# Patient Record
Sex: FEMALE | Race: BLACK OR AFRICAN AMERICAN | NOT HISPANIC OR LATINO | Employment: FULL TIME | URBAN - METROPOLITAN AREA
[De-identification: names, ages, dates, MRNs, and addresses within clinical notes are randomized per-mention and may not be internally consistent; named-entity substitution may affect disease eponyms.]

---

## 2017-04-25 ENCOUNTER — HOSPITAL ENCOUNTER (EMERGENCY)
Facility: HOSPITAL | Age: 32
Discharge: HOME | End: 2017-04-25
Attending: EMERGENCY MEDICINE
Payer: MEDICAID

## 2017-04-25 VITALS
DIASTOLIC BLOOD PRESSURE: 91 MMHG | HEART RATE: 89 BPM | SYSTOLIC BLOOD PRESSURE: 163 MMHG | HEIGHT: 66 IN | RESPIRATION RATE: 18 BRPM | WEIGHT: 132 LBS | OXYGEN SATURATION: 99 % | BODY MASS INDEX: 21.21 KG/M2 | TEMPERATURE: 98 F

## 2017-04-25 DIAGNOSIS — M79.675 PAIN OF TOE OF LEFT FOOT: Primary | ICD-10-CM

## 2017-04-25 DIAGNOSIS — R52 PAIN: ICD-10-CM

## 2017-04-25 PROCEDURE — 99283 EMERGENCY DEPT VISIT LOW MDM: CPT

## 2017-04-25 PROCEDURE — 25000003 PHARM REV CODE 250: Performed by: EMERGENCY MEDICINE

## 2017-04-25 RX ORDER — TRAMADOL HYDROCHLORIDE 50 MG/1
50 TABLET ORAL
Status: COMPLETED | OUTPATIENT
Start: 2017-04-25 | End: 2017-04-25

## 2017-04-25 RX ORDER — TRAMADOL HYDROCHLORIDE 50 MG/1
50 TABLET ORAL EVERY 6 HOURS PRN
Qty: 12 TABLET | Refills: 0 | Status: SHIPPED | OUTPATIENT
Start: 2017-04-25 | End: 2017-05-05

## 2017-04-25 RX ORDER — TRAMADOL HYDROCHLORIDE 50 MG/1
50 TABLET ORAL EVERY 6 HOURS PRN
Qty: 12 TABLET | Refills: 0 | Status: SHIPPED | OUTPATIENT
Start: 2017-04-25 | End: 2017-04-25

## 2017-04-25 RX ADMIN — TRAMADOL HYDROCHLORIDE 50 MG: 50 TABLET, COATED ORAL at 11:04

## 2017-04-25 NOTE — ED PROVIDER NOTES
SCRIBE #1 NOTE: I, Mainor Martinez, am scribing for, and in the presence of, José Cardenas Jr., MD. I have scribed the entire note.      History      Chief Complaint   Patient presents with    Toe Pain     pt c/o pain to left great toe x's 1 month. denies injury       Review of patient's allergies indicates:  No Known Allergies     HPI   HPI    4/25/2017, 11:15 AM   History obtained from the patient      History of Present Illness: Chandan Juan is a 31 y.o. female patient who presents to the Emergency Department for left great toe pain which onset gradually one month ago. Sxs are constant and moderate in severity. Pt reports sxs are worsening. Pt states toenail hurts. There are no mitigating or exacerbating factors noted. Pt denies any fever, N/V/D, numbness, trauma, injury, fall, trouble walking, and all other sxs at this time. Pt is requesting x-ray of toe. No further complaints or concerns at this time.     Arrival mode: Personal vehicle      PCP: Primary Doctor No       Past Medical History:  Past Medical History:   Diagnosis Date    Hypertension     Kidney stones        Past Surgical History:  Past Surgical History:   Procedure Laterality Date    LITHOTRIPSY           Family History:  unknown    Social History:  Social History     Social History Main Topics    Smoking status: Current Every Day Smoker     Packs/day: 0.50    Smokeless tobacco: unknown    Alcohol use Yes      Comment: occasionally     Drug use: No    Sexual activity: unknown       ROS   Review of Systems   Constitutional: Negative for fever.   HENT: Negative for sore throat.    Respiratory: Negative for shortness of breath.    Cardiovascular: Negative for chest pain.   Gastrointestinal: Negative for abdominal pain, constipation, diarrhea, nausea and vomiting.   Genitourinary: Negative for dysuria.   Musculoskeletal: Negative for back pain.        (+) L great toe pain   Skin: Negative for rash.   Neurological: Negative for  "weakness.   Hematological: Does not bruise/bleed easily.       Physical Exam    Initial Vitals   BP Pulse Resp Temp SpO2   04/25/17 1105 04/25/17 1105 04/25/17 1105 04/25/17 1105 04/25/17 1105   169/111 94 18 98 °F (36.7 °C) 99 %      Physical Exam  Nursing Notes and Vital Signs Reviewed.  Constitutional: Patient is in no acute distress. Awake and alert. Well-developed and well-nourished.  Head: Atraumatic. Normocephalic.  Eyes: PERRL. EOM intact. Conjunctivae are not pale. No scleral icterus.  ENT: Mucous membranes are moist. Oropharynx is clear and symmetric.    Neck: Supple. Full ROM. No lymphadenopathy.  Cardiovascular: Regular rate. Regular rhythm. No murmurs, rubs, or gallops. Distal pulses are 2+ and symmetric.  Pulmonary/Chest: No respiratory distress. Clear to auscultation bilaterally. No wheezing, rales, or rhonchi.  Abdominal: Soft and non-distended.  There is no tenderness.  No rebound, guarding, or rigidity. Good bowel sounds.  Genitourinary: No CVA tenderness  Musculoskeletal: Moves all extremities. No obvious deformities. No edema. No calf tenderness.  Skin: Warm and dry.  Neurological:  Alert, awake, and appropriate.  Normal speech.  No acute focal neurological deficits are appreciated.  Psychiatric: Normal affect. Good eye contact. Appropriate in content.    ED Course    Procedures  ED Vital Signs:  Vitals:    04/25/17 1105 04/25/17 1155   BP: (!) 169/111 (!) 163/91   Pulse: 94 89   Resp: 18 18   Temp: 98 °F (36.7 °C) 97.9 °F (36.6 °C)   TempSrc: Oral Oral   SpO2: 99% 99%   Weight: 59.9 kg (132 lb)    Height: 5' 6" (1.676 m)        Imaging Results:  Imaging Results         X-Ray Toe 2 or more views (Final result) Result time:  04/25/17 11:36:56    Final result by Stephen Medina MD (04/25/17 11:36:56)    Impression:     Negative      Electronically signed by: STEPHEN MEDINA MD  Date:     04/25/17  Time:    11:36     Narrative:    History: Left great toe pain    No bony or joint abnormality is " seen.                      The Emergency Provider reviewed the vital signs and test results, which are outlined above.    ED Discussion     12:46 PM: Discussed with pt all pertinent ED information and results. Discussed plan of treatment with pt. Gave pt all f/u and return to the ED instructions. All questions and concerns were addressed at this time. Pt understands and agrees to plan as discussed. Pt is stable for discharge.     I discussed with patient and/or family/caretaker that negative X-ray does not rule out occult fracture or other soft tissue injury.  Persistent pain greater than 7-10 days or increased pain requires follow up, specifically with orthopedics.     ED Medication(s):  Medications   tramadol tablet 50 mg (50 mg Oral Given 4/25/17 1150)       Discharge Medication List as of 4/25/2017 11:47 AM      START taking these medications    Details   tramadol (ULTRAM) 50 mg tablet Take 1 tablet (50 mg total) by mouth every 6 (six) hours as needed., Starting 4/25/2017, Until Fri 5/5/17, Print             Follow-up Information     Follow up with Natasha De Luna DPM. Call in 2 days.    Specialty:  Podiatry    Contact information:    [address]  [facility] [address] [phone]              Medical Decision Making    Medical Decision Making:   Clinical Tests:   Radiological Study: Ordered and Reviewed           Scribe Attestation:   Scribe #1: I performed the above scribed service and the documentation accurately describes the services I performed. I attest to the accuracy of the note.    Attending:   Physician Attestation Statement for Scribe #1: I, José Cardenas Jr., MD, personally performed the services described in this documentation, as scribed by Mainor Martinez, in my presence, and it is both accurate and complete.          Clinical Impression       ICD-10-CM ICD-9-CM   1. Pain of toe of left foot M79.675 729.5   2. Pain R52 780.96       Disposition:   Disposition: Discharged  Condition:  Stable         José Cardenas Jr., MD  04/25/17 1802

## 2017-04-25 NOTE — DISCHARGE INSTRUCTIONS
Understanding the Pain Response  Your pain is important. It can slow healing and keep you from being active. You may have acute or chronic pain. Both types of pain respond to treatment. Work with your health care professional. Together you can find relief.  Types of pain  Acute pain is caused by a health problem or injury. The pain usually goes away when its cause is treated. You may have pain:  · From an illness or injury that needs emergency care.  · After an operation, such as heart surgery.  · During and after the birth of your baby.  Chronic pain lasts 3 to 6 months or more. It can be caused by a health problem or injury, like arthritis or a shoulder strain. Chronic pain can also exist without a clear cause.  Your perception of pain  Pain is a complex phenomenon that involves many of the chemicals found naturally in the spinal cord and brain. All pain signals travel to the brain. The brain sends back signals to protect the body. The brain also makes its own painkillers (endorphins). These can help reduce the pain.    1. Pain starts in 1 or more parts of the body. In some cases, the site of the pain is far from its source.  2. Pain signals move through nerves and up the spinal cord.  3. The brain reads the signals as pain. Natural painkillers are released.  4. The feeling of pain can be reduced in this way.  Date Last Reviewed: 6/8/2015  © 6737-6815 [employer]. [address] [place] [address]. All rights reserved. This information is not intended as a substitute for professional medical care. Always follow your healthcare professional's instructions.          Acute Pain, Uncertain Cause  Pain can be caused by many conditions that range from very minor to very serious. In some cases, though, pain comes and goes with no apparent cause.  We were not able to find the exact cause for your pain. At this time there is no sign of any serious illness causing your pain. More tests may be needed  to determine the cause. In many cases, pain like this goes away by itself.  Home care  Take any medicines as prescribed. If another medicine was not prescribed for pain, you can take an over-the-counter pain medicine such as ibuprofen or acetaminophen. Use these as directed on the label.    Follow-up care  Follow up with your healthcare provider or our staff as directed.  When to seek medical advice  Call your healthcare provider for any of the following:  · Pain changes in pattern  · Pain doesn't lessen or gets worse  · New symptoms appear  · Fever of 100.4ºF (38ºC) or higher, or as directed by your healthcare provider  Date Last Reviewed: 7/26/2015  © 3233-3838 [employer]. [address] [place] [address], [place] [address]. All rights reserved. This information is not intended as a substitute for professional medical care. Always follow your healthcare professional's instructions.

## 2017-04-25 NOTE — ED AVS SNAPSHOT
[facility]  [address]  [place] [address]               Chandan Juan   2017 11:09 AM   ED    Description:  Female : 1985   Department:  [facility]            Your Care was Coordinated By:     Provider Role From To    José Cardenas Jr., MD Attending Provider 17 [id number] --      Reason for Visit     Toe Pain           Diagnoses this Visit        Comments    Pain of toe of left foot    -  Primary     Pain           ED Disposition     ED Disposition Condition Comment    Discharge             To Do List           Follow-up Information     Follow up with Natasha De Luna DPM. Call in 2 days.    Specialty:  Podiatry    Contact information:    [address]  [place] [address] [phone]         These Medications        Disp Refills Start End    tramadol (ULTRAM) 50 mg tablet 12 tablet 0 2017    Take 1 tablet (50 mg total) by mouth every 6 (six) hours as needed. - Oral      [facility]     [facility] Care Line -  Assistance  Unless otherwise directed by your provider, please contact [facility], our nurse care line that is available for  assistance.     Registered nurses in the [facility] provide: appointment scheduling, clinical advisement, health education, and other advisory services.  Call: [phone] (toll free)               Medications           Message regarding Medications     Verify the changes and/or additions to your medication regime listed below are the same as discussed with your clinician today.  If any of these changes or additions are incorrect, please notify your healthcare provider.        START taking these NEW medications        Refills    tramadol (ULTRAM) 50 mg tablet 0    Sig: Take 1 tablet (50 mg total) by mouth every 6 (six) hours as needed.    Class: Print    Route: Oral      These medications were administered today        Dose Freq     "tramadol tablet 50 mg 50 mg ED 1 Time    Sig: Take 1 tablet (50 mg total) by mouth ED 1 Time.    Class: Normal    Route: Oral           Verify that the below list of medications is an accurate representation of the medications you are currently taking.  If none reported, the list may be blank. If incorrect, please contact your healthcare provider. Carry this list with you in case of emergency.           Current Medications     diclofenac (VOLTAREN) 50 MG EC tablet Take 1 tablet (50 mg total) by mouth 3 (three) times daily as needed.    tramadol (ULTRAM) 50 mg tablet Take 1 tablet (50 mg total) by mouth every 6 (six) hours as needed.    tramadol tablet 50 mg Take 1 tablet (50 mg total) by mouth ED 1 Time.    valsartan (DIOVAN) 80 MG tablet Take 80 mg by mouth once daily.           Clinical Reference Information           Your Vitals Were     BP Pulse Temp Resp Height Weight    169/111 (BP Location: Right arm, Patient Position: Sitting) 94 98 °F (36.7 °C) (Oral) 18 5' 6" (1.676 m) 59.9 kg (132 lb)    SpO2 BMI             99% 21.31 kg/m2         Allergies as of 4/25/2017     No Known Allergies      Immunizations Administered on Date of Encounter - 4/25/2017     None      ED Micro, Lab, POCT     None      ED Imaging Orders     Start Ordered       Status Ordering Provider    04/25/17 1121 04/25/17 1121  X-Ray Toe 2 or more views  1 time imaging      Final result         Discharge Instructions         Understanding the Pain Response  Your pain is important. It can slow healing and keep you from being active. You may have acute or chronic pain. Both types of pain respond to treatment. Work with your health care professional. Together you can find relief.  Types of pain  Acute pain is caused by a health problem or injury. The pain usually goes away when its cause is treated. You may have pain:  · From an illness or injury that needs emergency care.  · After an operation, such as heart surgery.  · During and after the birth " of your baby.  Chronic pain lasts 3 to 6 months or more. It can be caused by a health problem or injury, like arthritis or a shoulder strain. Chronic pain can also exist without a clear cause.  Your perception of pain  Pain is a complex phenomenon that involves many of the chemicals found naturally in the spinal cord and brain. All pain signals travel to the brain. The brain sends back signals to protect the body. The brain also makes its own painkillers (endorphins). These can help reduce the pain.    1. Pain starts in 1 or more parts of the body. In some cases, the site of the pain is far from its source.  2. Pain signals move through nerves and up the spinal cord.  3. The brain reads the signals as pain. Natural painkillers are released.  4. The feeling of pain can be reduced in this way.  Date Last Reviewed: 6/8/2015  © 3759-5838 [employer]. [address] [place] [address]. All rights reserved. This information is not intended as a substitute for professional medical care. Always follow your healthcare professional's instructions.          Acute Pain, Uncertain Cause  Pain can be caused by many conditions that range from very minor to very serious. In some cases, though, pain comes and goes with no apparent cause.  We were not able to find the exact cause for your pain. At this time there is no sign of any serious illness causing your pain. More tests may be needed to determine the cause. In many cases, pain like this goes away by itself.  Home care  Take any medicines as prescribed. If another medicine was not prescribed for pain, you can take an over-the-counter pain medicine such as ibuprofen or acetaminophen. Use these as directed on the label.    Follow-up care  Follow up with your healthcare provider or our staff as directed.  When to seek medical advice  Call your healthcare provider for any of the following:  · Pain changes in pattern  · Pain doesn't lessen or gets worse  · New  symptoms appear  · Fever of 100.4ºF (38ºC) or higher, or as directed by your healthcare provider  Date Last Reviewed: 7/26/2015 © 2000-2016 [employer]. [address] [place] [address], [place] [address]. All rights reserved. This information is not intended as a substitute for professional medical care. Always follow your healthcare professional's instructions.          [facility] Sign-Up     Activating your [facility] account is as easy as 1-2-3!     1) Visit [email or web address], select Sign Up Now, enter this activation code and your date of birth, then select Next.  [email or web address]  Expires: 6/9/2017 11:47 AM      2) Create a username and password to use when you visit [facility] in the future and select a security question in case you lose your password and select Next.    3) Enter your e-mail address and click Sign Up!    Additional Information  If you have questions, please e-mail [email or web address] or call [phone] to talk to our [facility] staff. Remember, [facility] is NOT to be used for urgent needs. For medical emergencies, dial 911.         Smoking Cessation     If you would like to quit smoking:   You may be eligible for free services if you are a [place] resident and started smoking cigarettes before September 1, 1988.  Call the Smoking Cessation Trust (SCT) toll free at (052) 025-9359 or (395) 500-4933.   Call 5-548-QUIT-NOW if you do not meet the above criteria.   Contact us via email: [email or web address]   View our website for more information: [email or web address]         [facility] complies with applicable Federal civil rights laws and does not discriminate on the basis of race, color, national origin, age, disability, or sex.        Language Assistance Services     ATTENTION: Language assistance services are available, free of charge. Please call [phone].      ATENCIÓN: Si habla español, tiene a young disposición servicios gratuitos de asistencia  lingüística. [facility] [phone].     ESTHER Ý: N?u b?n nói Ti?ng Vi?t, có các d?ch v? h? tr? ngôn ng? mi?n phí dành cho b?n. G?i s? [phone].

## 2017-06-13 ENCOUNTER — HOSPITAL ENCOUNTER (EMERGENCY)
Facility: HOSPITAL | Age: 32
Discharge: HOME | End: 2017-06-13
Payer: MEDICAID

## 2017-06-13 VITALS
HEART RATE: 68 BPM | DIASTOLIC BLOOD PRESSURE: 90 MMHG | WEIGHT: 123 LBS | RESPIRATION RATE: 18 BRPM | HEIGHT: 66 IN | OXYGEN SATURATION: 100 % | TEMPERATURE: 98 F | SYSTOLIC BLOOD PRESSURE: 150 MMHG | BODY MASS INDEX: 19.77 KG/M2

## 2017-06-13 DIAGNOSIS — S39.011A STRAIN OF MUSCLE, FASCIA AND TENDON OF ABDOMEN, INITIAL ENCOUNTER: ICD-10-CM

## 2017-06-13 DIAGNOSIS — I10 ESSENTIAL HYPERTENSION: Primary | ICD-10-CM

## 2017-06-13 DIAGNOSIS — Z76.0 MEDICATION REFILL: ICD-10-CM

## 2017-06-13 DIAGNOSIS — R51.9 HEADACHE, UNSPECIFIED HEADACHE TYPE: ICD-10-CM

## 2017-06-13 LAB
B-HCG UR QL: NEGATIVE
BILIRUB UR QL STRIP: NEGATIVE
CLARITY UR: CLEAR
COLOR UR: YELLOW
GLUCOSE UR QL STRIP: NEGATIVE
HGB UR QL STRIP: NEGATIVE
KETONES UR QL STRIP: NEGATIVE
LEUKOCYTE ESTERASE UR QL STRIP: NEGATIVE
NITRITE UR QL STRIP: NEGATIVE
PH UR STRIP: 6 [PH] (ref 5–8)
PROT UR QL STRIP: NEGATIVE
SP GR UR STRIP: <=1.005 (ref 1–1.03)
URN SPEC COLLECT METH UR: ABNORMAL
UROBILINOGEN UR STRIP-ACNC: NEGATIVE EU/DL

## 2017-06-13 PROCEDURE — 63600175 PHARM REV CODE 636 W HCPCS: Performed by: PHYSICIAN ASSISTANT

## 2017-06-13 PROCEDURE — 81003 URINALYSIS AUTO W/O SCOPE: CPT

## 2017-06-13 PROCEDURE — 96372 THER/PROPH/DIAG INJ SC/IM: CPT

## 2017-06-13 PROCEDURE — 81025 URINE PREGNANCY TEST: CPT

## 2017-06-13 PROCEDURE — 99284 EMERGENCY DEPT VISIT MOD MDM: CPT | Mod: 25

## 2017-06-13 RX ORDER — IBUPROFEN 800 MG/1
800 TABLET ORAL 3 TIMES DAILY PRN
Qty: 15 TABLET | Refills: 0 | Status: SHIPPED | OUTPATIENT
Start: 2017-06-13 | End: 2017-06-13 | Stop reason: CLARIF

## 2017-06-13 RX ORDER — KETOROLAC TROMETHAMINE 30 MG/ML
60 INJECTION, SOLUTION INTRAMUSCULAR; INTRAVENOUS ONCE
Status: COMPLETED | OUTPATIENT
Start: 2017-06-13 | End: 2017-06-13

## 2017-06-13 RX ORDER — VALSARTAN 80 MG/1
80 TABLET ORAL DAILY
Qty: 30 TABLET | Refills: 0 | Status: SHIPPED | OUTPATIENT
Start: 2017-06-13 | End: 2017-06-13

## 2017-06-13 RX ORDER — VALSARTAN 80 MG/1
80 TABLET ORAL DAILY
Qty: 90 TABLET | Refills: 3 | Status: SHIPPED | OUTPATIENT
Start: 2017-06-13 | End: 2018-06-13

## 2017-06-13 RX ORDER — IBUPROFEN 800 MG/1
800 TABLET ORAL 3 TIMES DAILY PRN
Qty: 15 TABLET | Refills: 0 | Status: SHIPPED | OUTPATIENT
Start: 2017-06-13 | End: 2017-06-13

## 2017-06-13 RX ORDER — IBUPROFEN 800 MG/1
800 TABLET ORAL 3 TIMES DAILY PRN
Qty: 15 TABLET | Refills: 0 | Status: SHIPPED | OUTPATIENT
Start: 2017-06-13

## 2017-06-13 RX ORDER — VALSARTAN 80 MG/1
80 TABLET ORAL DAILY
Qty: 90 TABLET | Refills: 3 | Status: SHIPPED | OUTPATIENT
Start: 2017-06-13 | End: 2017-06-13

## 2017-06-13 RX ORDER — VALSARTAN 80 MG/1
80 TABLET ORAL DAILY
Qty: 30 TABLET | Refills: 0 | Status: SHIPPED | OUTPATIENT
Start: 2017-06-13 | End: 2017-06-13 | Stop reason: CLARIF

## 2017-06-13 RX ADMIN — KETOROLAC TROMETHAMINE 60 MG: 60 INJECTION, SOLUTION INTRAMUSCULAR at 02:06

## 2017-06-13 NOTE — ED PROVIDER NOTES
"  History      Chief Complaint   Patient presents with    Abdominal Pain     Pt states, "I am having bad pain in my right lower abdomen".       Review of patient's allergies indicates:  No Known Allergies     HPI   HPI    2017, 12:40 PM   History obtained from the patient      History of Present Illness: Chandan Juan is a 31 y.o. female patient who presents to the Emergency Department for right lower abdominal pain x 1-2 weeks.  Sneezing, coughing, and laughing worsen pain symptoms.  Associated symptoms include headache.  Denies fever, vomiting, diarrhea, nausea.   Patient states she has not taken blood pressure medication in 2-3 months.  Reports minimal relief with BC powder.  Rates headache pain /10.  Abdominal pain is intermittent.        Arrival mode: Personal vehicle    PCP: Primary Doctor No       Past Medical History:  Past Medical History:   Diagnosis Date    Hypertension     Kidney stones        Past Surgical History:  Past Surgical History:   Procedure Laterality Date     SECTION      LITHOTRIPSY           Family History:  Family History   Problem Relation Age of Onset    Hypertension Mother     Hypertension Father        Social History:  Social History     Social History Main Topics    Smoking status: Current Every Day Smoker     Packs/day: 0.50    Smokeless tobacco: Not on file    Alcohol use Yes      Comment: occasionally     Drug use: No    Sexual activity: Yes     Birth control/ protection: None       ROS   Review of Systems   Constitutional: Negative for chills and fever.   HENT: Negative for ear pain and rhinorrhea.    Respiratory: Negative for cough and wheezing.    Gastrointestinal: Positive for abdominal pain. Negative for diarrhea, nausea and vomiting.   Neurological: Positive for headaches.       Physical Exam      Initial Vitals [17 1217]   BP Pulse Resp Temp SpO2   (!) 174/109 97 20 99.6 °F (37.6 °C) 98 %      Physical Exam  Nursing Notes and Vital Signs " "Reviewed.  Constitutional: Patient is in no apparent distress. Awake and alert. Well-developed and well-nourished.  Head: Atraumatic. Normocephalic.  Eyes: PERRL. EOM intact. Conjunctivae are not pale. No scleral icterus.  ENT: Mucous membranes are moist. Oropharynx is clear and symmetric.    Neck: Supple. Full ROM. No lymphadenopathy.  Cardiovascular: Regular rate. Regular rhythm. No murmurs, rubs, or gallops. Distal pulses are 2+ and symmetric.  Pulmonary/Chest: No respiratory distress. Clear to auscultation bilaterally. No wheezing, rales, or rhonchi.  Abdominal: Soft and non-distended.  There is right lower quadrant tenderness; small mass palpated.  Pain of RLQ reproduced with lumbar extension.  No rebound, guarding, or rigidity. Good bowel sounds  Genitourinary: No CVA tenderness  Pelvic:  Normal external genitalia without lesions. Normal hair distribution. Adequate perineal body, normal urethral meatus. Vagina moist and well rugated without lesions or discharge. Cervix pink, without lesions, discharge or tenderness. No significant cystocele or rectocele. Bimanual exam shows uterus to be normal size, regular, mobile and nontender. Adnexa without masses or tenderness.   Musculoskeletal: Moves all extremities. No obvious deformities. No edema. No calf tenderness.  Skin: Warm and dry.  Neurological:  Alert, awake, and appropriate.  Normal speech.  No acute focal neurological deficits are appreciated.  Psychiatric: Normal affect. Good eye contact. Appropriate in content.    ED Course    Procedures  ED Vital Signs:  Vitals:    06/13/17 1217 06/13/17 1422   BP: (!) 174/109 (!) 150/90   Pulse: 97 68   Resp: 20 18   Temp: 99.6 °F (37.6 °C) 98 °F (36.7 °C)   TempSrc: Oral Oral   SpO2: 98% 100%   Weight: 55.8 kg (123 lb)    Height: 5' 6" (1.676 m)        Abnormal Lab Results:  Labs Reviewed   URINALYSIS - Abnormal; Notable for the following:        Result Value    Specific Gravity, UA <=1.005 (*)     All other components " within normal limits   PREGNANCY TEST, URINE RAPID        All Lab Results:  Results for orders placed or performed during the hospital encounter of 06/13/17   Urinalysis Clean Catch   Result Value Ref Range    Specimen UA Urine, Clean Catch     Color, UA Yellow Yellow, Straw, Jacque    Appearance, UA Clear Clear    pH, UA 6.0 5.0 - 8.0    Specific Gravity, UA <=1.005 (A) 1.005 - 1.030    Protein, UA Negative Negative    Glucose, UA Negative Negative    Ketones, UA Negative Negative    Bilirubin (UA) Negative Negative    Occult Blood UA Negative Negative    Nitrite, UA Negative Negative    Urobilinogen, UA Negative <2.0 EU/dL    Leukocytes, UA Negative Negative   Rapid Pregnancy, Urine   Result Value Ref Range    Preg Test, Ur Negative          Imaging Results:  Imaging Results          US Soft Tissue Misc (Final result)  Result time 06/13/17 14:35:25   Procedure changed from US Abdomen Limited     Final result by Beto Mitchell III, MD (06/13/17 14:35:25)                 Impression:      1. Unremarkable exam      Electronically signed by: BETO MITCHELL MD  Date:     06/13/17  Time:    14:35              Narrative:    US SOFT TISSUE MISC    Clinical Indication:  Palpable lump in the right lower quadrant       Findings: Targeted ultrasound of the right lower quadrant abdominal wall demonstrates no mass, cystic lesion, fluid collection, hernia or other abnormality.                                      The Emergency Provider reviewed the vital signs and test results, which are outlined above.    ED Discussion     2:20 PM:  Discussed with pt all pertinent ED information and results. Discussed pt dx and plan of tx. Gave pt all f/u and return to the ED instructions. All questions and concerns were addressed at this time. Pt expresses understanding of information and instructions, and is comfortable with plan to discharge. Pt is stable for discharge.    Pre-hypertension/Hypertension: The pt has been informed that they may  have pre-hypertension or hypertension based on a blood pressure reading in the ED. I recommend that the pt call the PCP listed on their discharge instructions or a physician of their choice this week to arrange f/u for further evaluation of possible pre-hypertension or hypertension.     I discussed with patient and/or family/caretaker that evaluation in the ED does not suggest any emergent or life threatening medical conditions requiring immediate intervention beyond what was provided in the ED, and I believe patient is safe for discharge.  Regardless, an unremarkable evaluation in the ED does not preclude the development or presence of a serious of life threatening condition. As such, patient was instructed to return immediately for any worsening or change in current symptoms.      ED Medication(s):  Medications   ketorolac injection 60 mg (60 mg Intramuscular Given 6/13/17 1400)       Discharge Medication List as of 6/13/2017  3:00 PM          Follow-up Information     [facility] in 2 days.    Specialty:  Urgent Care  Contact information:  [address] [facility] [address] [phone]             [facility] in 2 days.    Specialty:  Internal Medicine  Contact information:  [address] [facility] [address] [phone]  Additional information:  (off [facility]) 1st floor                   Medical Decision Making                  Clinical Impression       ICD-10-CM ICD-9-CM   1. Essential hypertension I10 401.9   2. Strain of muscle, fascia and tendon of abdomen, initial encounter S39.011A 848.8   3. Headache, unspecified headache type R51 784.0   4. Medication refill Z76.0 V68.1       Disposition:   Disposition: Discharged  Condition: Stable           Erika Mays PA-C  06/13/17 1538

## 2023-03-17 ENCOUNTER — HOSPITAL ENCOUNTER (EMERGENCY)
Facility: HOSPITAL | Age: 38
Discharge: HOME | End: 2023-03-17
Attending: EMERGENCY MEDICINE
Payer: MEDICAID

## 2023-03-17 VITALS
SYSTOLIC BLOOD PRESSURE: 162 MMHG | HEIGHT: 64 IN | RESPIRATION RATE: 16 BRPM | TEMPERATURE: 98 F | OXYGEN SATURATION: 100 % | BODY MASS INDEX: 22.53 KG/M2 | HEART RATE: 72 BPM | WEIGHT: 132 LBS | DIASTOLIC BLOOD PRESSURE: 88 MMHG

## 2023-03-17 DIAGNOSIS — I10 HYPERTENSION, UNSPECIFIED TYPE: ICD-10-CM

## 2023-03-17 DIAGNOSIS — R51.9 NONINTRACTABLE HEADACHE, UNSPECIFIED CHRONICITY PATTERN, UNSPECIFIED HEADACHE TYPE: ICD-10-CM

## 2023-03-17 DIAGNOSIS — I10 HYPERTENSION: ICD-10-CM

## 2023-03-17 DIAGNOSIS — F19.10 DRUG ABUSE: ICD-10-CM

## 2023-03-17 DIAGNOSIS — F41.9 ANXIETY: Primary | ICD-10-CM

## 2023-03-17 LAB
AMPHET+METHAMPHET UR QL: ABNORMAL
ANION GAP SERPL CALC-SCNC: 9 MMOL/L (ref 8–16)
B-HCG UR QL: NEGATIVE
BACTERIA #/AREA URNS HPF: ABNORMAL /HPF
BARBITURATES UR QL SCN>200 NG/ML: NEGATIVE
BASOPHILS # BLD AUTO: 0.04 K/UL (ref 0–0.2)
BASOPHILS NFR BLD: 1.3 % (ref 0–1.9)
BENZODIAZ UR QL SCN>200 NG/ML: NEGATIVE
BILIRUB UR QL STRIP: NEGATIVE
BUN SERPL-MCNC: 15 MG/DL (ref 6–20)
BZE UR QL SCN: NEGATIVE
CALCIUM SERPL-MCNC: 8.5 MG/DL (ref 8.7–10.5)
CANNABINOIDS UR QL SCN: ABNORMAL
CHLORIDE SERPL-SCNC: 103 MMOL/L (ref 95–110)
CLARITY UR: ABNORMAL
CO2 SERPL-SCNC: 25 MMOL/L (ref 23–29)
COLOR UR: YELLOW
CREAT SERPL-MCNC: 0.8 MG/DL (ref 0.5–1.4)
CREAT UR-MCNC: 107.7 MG/DL (ref 15–325)
DIFFERENTIAL METHOD: ABNORMAL
EOSINOPHIL # BLD AUTO: 0 K/UL (ref 0–0.5)
EOSINOPHIL NFR BLD: 0.6 % (ref 0–8)
ERYTHROCYTE [DISTWIDTH] IN BLOOD BY AUTOMATED COUNT: 15.8 % (ref 11.5–14.5)
EST. GFR  (NO RACE VARIABLE): >60 ML/MIN/1.73 M^2
GLUCOSE SERPL-MCNC: 121 MG/DL (ref 70–110)
GLUCOSE UR QL STRIP: NEGATIVE
HCT VFR BLD AUTO: 34.9 % (ref 37–48.5)
HGB BLD-MCNC: 10.4 G/DL (ref 12–16)
HGB UR QL STRIP: ABNORMAL
IMM GRANULOCYTES # BLD AUTO: 0.01 K/UL (ref 0–0.04)
IMM GRANULOCYTES NFR BLD AUTO: 0.3 % (ref 0–0.5)
KETONES UR QL STRIP: NEGATIVE
LEUKOCYTE ESTERASE UR QL STRIP: ABNORMAL
LYMPHOCYTES # BLD AUTO: 0.7 K/UL (ref 1–4.8)
LYMPHOCYTES NFR BLD: 20.4 % (ref 18–48)
MCH RBC QN AUTO: 24.8 PG (ref 27–31)
MCHC RBC AUTO-ENTMCNC: 29.8 G/DL (ref 32–36)
MCV RBC AUTO: 83 FL (ref 82–98)
METHADONE UR QL SCN>300 NG/ML: NEGATIVE
MICROSCOPIC COMMENT: ABNORMAL
MONOCYTES # BLD AUTO: 0.2 K/UL (ref 0.3–1)
MONOCYTES NFR BLD: 7.2 % (ref 4–15)
NEUTROPHILS # BLD AUTO: 2.2 K/UL (ref 1.8–7.7)
NEUTROPHILS NFR BLD: 70.2 % (ref 38–73)
NITRITE UR QL STRIP: POSITIVE
NRBC BLD-RTO: 0 /100 WBC
OPIATES UR QL SCN: NEGATIVE
PCP UR QL SCN>25 NG/ML: NEGATIVE
PH UR STRIP: 8 [PH] (ref 5–8)
PLATELET # BLD AUTO: 201 K/UL (ref 150–450)
PMV BLD AUTO: 10.6 FL (ref 9.2–12.9)
POTASSIUM SERPL-SCNC: 3.7 MMOL/L (ref 3.5–5.1)
PROT UR QL STRIP: ABNORMAL
RBC # BLD AUTO: 4.19 M/UL (ref 4–5.4)
RBC #/AREA URNS HPF: 2 /HPF (ref 0–4)
SODIUM SERPL-SCNC: 137 MMOL/L (ref 136–145)
SP GR UR STRIP: 1.02 (ref 1–1.03)
SQUAMOUS #/AREA URNS HPF: 5 /HPF
TOXICOLOGY INFORMATION: ABNORMAL
URN SPEC COLLECT METH UR: ABNORMAL
UROBILINOGEN UR STRIP-ACNC: NEGATIVE EU/DL
WBC # BLD AUTO: 3.18 K/UL (ref 3.9–12.7)
WBC #/AREA URNS HPF: 18 /HPF (ref 0–5)
WBC CLUMPS URNS QL MICRO: ABNORMAL

## 2023-03-17 PROCEDURE — 80048 BASIC METABOLIC PNL TOTAL CA: CPT | Performed by: EMERGENCY MEDICINE

## 2023-03-17 PROCEDURE — 25000003 PHARM REV CODE 250: Performed by: EMERGENCY MEDICINE

## 2023-03-17 PROCEDURE — 81025 URINE PREGNANCY TEST: CPT | Performed by: NURSE PRACTITIONER

## 2023-03-17 PROCEDURE — 93010 EKG 12-LEAD: ICD-10-PCS | Mod: ,,, | Performed by: INTERNAL MEDICINE

## 2023-03-17 PROCEDURE — 96374 THER/PROPH/DIAG INJ IV PUSH: CPT

## 2023-03-17 PROCEDURE — 80307 DRUG TEST PRSMV CHEM ANLYZR: CPT | Performed by: NURSE PRACTITIONER

## 2023-03-17 PROCEDURE — 85025 COMPLETE CBC W/AUTO DIFF WBC: CPT | Performed by: EMERGENCY MEDICINE

## 2023-03-17 PROCEDURE — 87088 URINE BACTERIA CULTURE: CPT | Performed by: NURSE PRACTITIONER

## 2023-03-17 PROCEDURE — 87186 SC STD MICRODIL/AGAR DIL: CPT | Performed by: NURSE PRACTITIONER

## 2023-03-17 PROCEDURE — 96375 TX/PRO/DX INJ NEW DRUG ADDON: CPT

## 2023-03-17 PROCEDURE — 93010 ELECTROCARDIOGRAM REPORT: CPT | Mod: ,,, | Performed by: INTERNAL MEDICINE

## 2023-03-17 PROCEDURE — 81000 URINALYSIS NONAUTO W/SCOPE: CPT | Mod: 59 | Performed by: NURSE PRACTITIONER

## 2023-03-17 PROCEDURE — 99291 CRITICAL CARE FIRST HOUR: CPT | Mod: 25

## 2023-03-17 PROCEDURE — 87086 URINE CULTURE/COLONY COUNT: CPT | Performed by: NURSE PRACTITIONER

## 2023-03-17 PROCEDURE — 63600175 PHARM REV CODE 636 W HCPCS: Performed by: EMERGENCY MEDICINE

## 2023-03-17 PROCEDURE — 93005 ELECTROCARDIOGRAM TRACING: CPT

## 2023-03-17 PROCEDURE — 87077 CULTURE AEROBIC IDENTIFY: CPT | Performed by: NURSE PRACTITIONER

## 2023-03-17 RX ORDER — HYDRALAZINE HYDROCHLORIDE 10 MG/1
10 TABLET, FILM COATED ORAL
COMMUNITY
Start: 2022-08-09

## 2023-03-17 RX ORDER — CLONIDINE HYDROCHLORIDE 0.2 MG/1
0.2 TABLET ORAL
Status: COMPLETED | OUTPATIENT
Start: 2023-03-17 | End: 2023-03-17

## 2023-03-17 RX ORDER — LABETALOL HYDROCHLORIDE 5 MG/ML
10 INJECTION, SOLUTION INTRAVENOUS
Status: COMPLETED | OUTPATIENT
Start: 2023-03-17 | End: 2023-03-17

## 2023-03-17 RX ORDER — HYDRALAZINE HYDROCHLORIDE 20 MG/ML
10 INJECTION INTRAMUSCULAR; INTRAVENOUS
Status: COMPLETED | OUTPATIENT
Start: 2023-03-17 | End: 2023-03-17

## 2023-03-17 RX ADMIN — HYDRALAZINE HYDROCHLORIDE 10 MG: 20 INJECTION, SOLUTION INTRAMUSCULAR; INTRAVENOUS at 05:03

## 2023-03-17 RX ADMIN — LABETALOL HYDROCHLORIDE 10 MG: 5 INJECTION INTRAVENOUS at 04:03

## 2023-03-17 RX ADMIN — CLONIDINE HYDROCHLORIDE 0.2 MG: 0.2 TABLET ORAL at 06:03

## 2023-03-17 NOTE — FIRST PROVIDER EVALUATION
"Medical screening examination initiated.  I have conducted a focused provider triage encounter, findings are as follows:    Brief history of present illness:  Patient presents with headache, dizziness and elevated blood pressure.    Vitals:    03/17/23 0042   BP: (!) 196/116   BP Location: Right arm   Patient Position: Sitting   Pulse: 92   Resp: 14   Temp: 97.5 °F (36.4 °C)   TempSrc: Oral   SpO2: 100%   Weight: 59.9 kg (132 lb)   Height: 5' 4" (1.626 m)       Pertinent physical exam:      Brief workup plan:      Preliminary workup initiated; this workup will be continued and followed by the physician or advanced practice provider that is assigned to the patient when roomed.  "

## 2023-03-17 NOTE — ED PROVIDER NOTES
"SCRIBE #1 NOTE: I, Solomon Evans, am scribing for, and in the presence of, Sammy Rivera MD. I have scribed the HPI, ROS, and PEx.     SCRIBE #2 NOTE: I, Anjali Olmedo, am scribing for, and in the presence of,  José Cardenas Jr., MD. I have scribed the remaining portions of the note not scribed by Scribe #1.    History     Chief Complaint   Patient presents with    Anxiety     Per pt "my head hurts and I'm dizzy" per EMS report, pt has anxiety post smoking a joint. Noncompliance with BP medications. Pt nodding off in triage      HPI  3/17/2023, 4:09 AM  History obtained from the patient    HPI:  Chandan Juan is a 37 y.o. female with a PMH of HTN who presents to the the [facility] emergency department for evaluation of HA which onset  x 1 day. Pt reports that her HA is most likely due to her elevated BP. Pt also states that she is noncompliant with her BP medication. No associated symptoms reported. Exacerbating factors: not reported. Prior treatment not reported. No other complaints or concerns.     Arrival mode: Personal vehicle         PCP: Primary Doctor No    Review of patient's allergies indicates:  No Known Allergies   Past Medical History:   Diagnosis Date    Hypertension     Kidney stones      Past Surgical History:   Procedure Laterality Date     SECTION      LITHOTRIPSY         Family History   Problem Relation Age of Onset    Hypertension Mother     Hypertension Father      Social History     Tobacco Use    Smoking status: Every Day     Packs/day: 0.50     Types: Cigarettes    Smokeless tobacco: Not on file   Substance and Sexual Activity    Alcohol use: Yes     Comment: occasionally     Drug use: No    Sexual activity: Yes     Birth control/protection: None      Review of Systems     Review of Systems   Constitutional: Negative.    HENT: Negative.     Eyes: Negative.    Respiratory: Negative.     Cardiovascular: Negative.    Gastrointestinal: Negative.    Endocrine: Negative.  "   Musculoskeletal: Negative.    Skin: Negative.    Allergic/Immunologic: Negative.    Neurological:  Positive for headaches.   Hematological: Negative.    Psychiatric/Behavioral: Negative.          Physical Exam     Initial Vitals [03/17/23 0042]   BP Pulse Resp Temp SpO2   (!) 196/116 92 14 97.5 °F (36.4 °C) 100 %      MAP       --          Physical Exam  Nursing notes and vital signs reviewed.  Constitutional: Patient is in mild distress. Pt is somnolent  Head: Normocephalic. Atraumatic.   Eyes:  Conjunctivae are not pale. No scleral icterus. Left eye exotropia  ENT: Mucous membranes moist.   Neck: Supple.   Cardiovascular: Regular rate. Regular rhythm.   Pulmonary: No respiratory distress.   Abdominal: Non-distended.   Musculoskeletal: Moves all extremities. No obvious deformities. No edema.   Skin: Warm and dry.   Neurological:  Alert, awake, and appropriate. Normal speech. No acute lateralizing neurologic deficits appreciated.   Psychiatric: Normal affect.       ED Course   Critical Care    Date/Time: 3/17/2023 5:32 AM  Performed by: Sammy Rivera MD  Authorized by: Sammy Rivera MD   Direct patient critical care time: 15 minutes  Additional history critical care time: 6 minutes  Ordering / reviewing critical care time: 7 minutes  Documentation critical care time: 7 minutes  Total critical care time (exclusive of procedural time) : 35 minutes  Critical care time was exclusive of separately billable procedures and treating other patients and teaching time.  Critical care was necessary to treat or prevent imminent or life-threatening deterioration of the following conditions: HTN emergency with EKG changes.  Critical care was time spent personally by me on the following activities: blood draw for specimens, development of treatment plan with patient or surrogate, interpretation of cardiac output measurements, evaluation of patient's response to treatment, examination of patient, obtaining history from  "patient or surrogate, ordering and performing treatments and interventions, ordering and review of laboratory studies, ordering and review of radiographic studies, pulse oximetry, re-evaluation of patient's condition and review of old charts.      Vitals:    03/17/23 0042 03/17/23 0445 03/17/23 0514 03/17/23 0530   BP: (!) 196/116 (!) 186/114 (!) 184/128 (!) 171/110   Pulse: 92 78 79 64   Resp: 14 16 20 18   Temp: 97.5 °F (36.4 °C) 98.1 °F (36.7 °C)     TempSrc: Oral Oral     SpO2: 100% 99% 99% 100%   Weight: 59.9 kg (132 lb)      Height: 5' 4" (1.626 m)       03/17/23 0532 03/17/23 0600 03/17/23 0630 03/17/23 0818   BP: (!) 171/110 (!) 161/104 (!) 177/96 (!) 162/88   Pulse:  86 77 72   Resp:  19 15 16   Temp:   98.1 °F (36.7 °C) 98.1 °F (36.7 °C)   TempSrc:   Oral Oral   SpO2:  100% 100% 100%   Weight:       Height:         Lab Results Interpreted as Abnormal:  Labs Reviewed   DRUG SCREEN PANEL, URINE EMERGENCY - Abnormal; Notable for the following components:       Result Value    Amphetamine Screen, Ur Presumptive Positive (*)     THC Presumptive Positive (*)     All other components within normal limits    Narrative:     Specimen Source->Urine   URINALYSIS, REFLEX TO URINE CULTURE - Abnormal; Notable for the following components:    Appearance, UA Hazy (*)     Protein, UA Trace (*)     Occult Blood UA 2+ (*)     Nitrite, UA Positive (*)     Leukocytes, UA 1+ (*)     All other components within normal limits    Narrative:     Specimen Source->Urine   CBC W/ AUTO DIFFERENTIAL - Abnormal; Notable for the following components:    WBC 3.18 (*)     Hemoglobin 10.4 (*)     Hematocrit 34.9 (*)     MCH 24.8 (*)     MCHC 29.8 (*)     RDW 15.8 (*)     Lymph # 0.7 (*)     Mono # 0.2 (*)     All other components within normal limits   BASIC METABOLIC PANEL - Abnormal; Notable for the following components:    Glucose 121 (*)     Calcium 8.5 (*)     All other components within normal limits   URINALYSIS MICROSCOPIC - Abnormal; " Notable for the following components:    WBC, UA 18 (*)     WBC Clumps, UA Many (*)     Bacteria Moderate (*)     All other components within normal limits    Narrative:     Specimen Source->Urine   CULTURE, URINE   PREGNANCY TEST, URINE RAPID    Narrative:     Specimen Source->Urine      All Lab Results:  Results for orders placed or performed during the hospital encounter of 03/17/23   Drug screen panel, emergency   Result Value Ref Range    Benzodiazepines Negative Negative    Methadone metabolites Negative Negative    Cocaine (Metab.) Negative Negative    Opiate Scrn, Ur Negative Negative    Barbiturate Screen, Ur Negative Negative    Amphetamine Screen, Ur Presumptive Positive (A) Negative    THC Presumptive Positive (A) Negative    Phencyclidine Negative Negative    Creatinine, Urine 107.7 15.0 - 325.0 mg/dL    Toxicology Information SEE COMMENT    Urinalysis, Reflex to Urine Culture Urine, Clean Catch    Specimen: Urine   Result Value Ref Range    Specimen UA Urine, Clean Catch     Color, UA Yellow Yellow, Straw, Jacque    Appearance, UA Hazy (A) Clear    pH, UA 8.0 5.0 - 8.0    Specific Gravity, UA 1.020 1.005 - 1.030    Protein, UA Trace (A) Negative    Glucose, UA Negative Negative    Ketones, UA Negative Negative    Bilirubin (UA) Negative Negative    Occult Blood UA 2+ (A) Negative    Nitrite, UA Positive (A) Negative    Urobilinogen, UA Negative <2.0 EU/dL    Leukocytes, UA 1+ (A) Negative   Pregnancy, urine rapid   Result Value Ref Range    Preg Test, Ur Negative    CBC auto differential   Result Value Ref Range    WBC 3.18 (L) 3.90 - 12.70 K/uL    RBC 4.19 4.00 - 5.40 M/uL    Hemoglobin 10.4 (L) 12.0 - 16.0 g/dL    Hematocrit 34.9 (L) 37.0 - 48.5 %    MCV 83 82 - 98 fL    MCH 24.8 (L) 27.0 - 31.0 pg    MCHC 29.8 (L) 32.0 - 36.0 g/dL    RDW 15.8 (H) 11.5 - 14.5 %    Platelets 201 150 - 450 K/uL    MPV 10.6 9.2 - 12.9 fL    Immature Granulocytes 0.3 0.0 - 0.5 %    Gran # (ANC) 2.2 1.8 - 7.7 K/uL     Immature Grans (Abs) 0.01 0.00 - 0.04 K/uL    Lymph # 0.7 (L) 1.0 - 4.8 K/uL    Mono # 0.2 (L) 0.3 - 1.0 K/uL    Eos # 0.0 0.0 - 0.5 K/uL    Baso # 0.04 0.00 - 0.20 K/uL    nRBC 0 0 /100 WBC    Gran % 70.2 38.0 - 73.0 %    Lymph % 20.4 18.0 - 48.0 %    Mono % 7.2 4.0 - 15.0 %    Eosinophil % 0.6 0.0 - 8.0 %    Basophil % 1.3 0.0 - 1.9 %    Differential Method Automated    Basic metabolic panel   Result Value Ref Range    Sodium 137 136 - 145 mmol/L    Potassium 3.7 3.5 - 5.1 mmol/L    Chloride 103 95 - 110 mmol/L    CO2 25 23 - 29 mmol/L    Glucose 121 (H) 70 - 110 mg/dL    BUN 15 6 - 20 mg/dL    Creatinine 0.8 0.5 - 1.4 mg/dL    Calcium 8.5 (L) 8.7 - 10.5 mg/dL    Anion Gap 9 8 - 16 mmol/L    eGFR >60 >60 mL/min/1.73 m^2   Urinalysis Microscopic   Result Value Ref Range    RBC, UA 2 0 - 4 /hpf    WBC, UA 18 (H) 0 - 5 /hpf    WBC Clumps, UA Many (A) None-Rare    Bacteria Moderate (A) None-Occ /hpf    Squam Epithel, UA 5 /hpf    Microscopic Comment SEE COMMENT      Imaging Results              X-Ray Chest 1 View (Final result)  Result time 03/17/23 07:10:11      Final result by Fortunato Guerrier MD (03/17/23 07:10:11)                   Impression:      No acute process seen.      Electronically signed by: Fortunato Guerrier MD  Date:    03/17/2023  Time:    07:10               Narrative:    EXAMINATION:  XR CHEST 1 VIEW    CLINICAL HISTORY:  Essential (primary) hypertension    FINDINGS:  Single view of the chest.  No comparison    Cardiac silhouette is normal.  The lungs demonstrate no evidence of active disease.  No evidence of pleural effusion or pneumothorax.  Bones appear intact.                                       CT Head Without Contrast (Final result)  Result time 03/17/23 07:01:55      Final result by Fortunato Guerrier MD (03/17/23 07:01:55)                   Impression:      No acute abnormality.    All CT scans at this facility use dose modulation, iterative reconstruction, and/or weight based dosing when  appropriate to reduce radiation dose to as low as reasonable achievable.      Electronically signed by: Fortunato Guerrier MD  Date:    03/17/2023  Time:    07:01               Narrative:    EXAMINATION:  CT HEAD WITHOUT CONTRAST    CLINICAL HISTORY:  Headache, new or worsening, neuro deficit (Age 19-49y);    TECHNIQUE:  Low dose axial CT images obtained throughout the head without intravenous contrast. Sagittal and coronal reconstructions were performed.    All CT scans at this facility use dose modulation, iterative reconstruction, and/or weight based dosing when appropriate to reduce radiation dose to as low as reasonable achievable.    COMPARISON:  None.    FINDINGS:  Intracranial compartment:    The brain parenchyma appears normal. No parenchymal mass, hemorrhage, edema or major vascular distribution infarct.    Ventricles and sulci are normal in size for age without evidence of hydrocephalus.    No extra-axial blood or fluid collections.    Skull/extracranial contents (limited evaluation): No fracture. Mastoid air cells and paranasal sinuses are essentially clear.                                     ED Physician's independent review of the above imaging: agree with radiologist.    The EKG was ordered, reviewed, and independently interpreted by the ED Physician:  Interpretation time: 5:29  Rate: 64 bpm  Rhythm: normal sinus rhythm  Interpretation: T wave abnormality, consider anterior ischemia. No STEMI.      The emergency physician reviewed the vital signs and test results, which are outlined above.     ED Discussion     6:00 AM: Dr. Rivera transfers care of patient to Dr. Cardenas pending lab and imaging results.     8:01 AM: Reassessed pt at this time.   Discussed with pt all pertinent ED information and results. Discussed pt dx and plan of tx. Gave pt all f/u and return to the ED instructions. All questions and concerns were addressed at this time. Pt expresses understanding of information and instructions, and  is comfortable with plan to discharge. Pt is stable for discharge.    I discussed with patient and/or family/caretaker that evaluation in the ED does not suggest any emergent or life threatening medical conditions requiring immediate intervention beyond what was provided in the ED, and I believe patient is safe for discharge.  Regardless, an unremarkable evaluation in the ED does not preclude the development or presence of a serious of life threatening condition. As such, patient was instructed to return immediately for any worsening or change in current symptoms.      Patient's evaluation in the ED does not suggest any emergent or life-threatening medical conditions requiring immediate intervention beyond what was provided in the ED, and I believe patient is safe for discharge.  Regardless, an unremarkable evaluation in the ED does not preclude the development or presence of a serious or life-threatening condition. As such, patient was given return instructions for any change or worsening of symptoms.     Medical Decision Making:   Clinical Tests:   Lab Tests: Ordered and Reviewed  Radiological Study: Ordered and Reviewed  Medical Tests: Ordered and Reviewed       ED Medication(s):  Medications   labetaloL injection 10 mg (10 mg Intravenous Given 3/17/23 0450)   hydrALAZINE injection 10 mg (10 mg Intravenous Given 3/17/23 0532)   cloNIDine tablet 0.2 mg (0.2 mg Oral Given 3/17/23 0636)     Discharge Medication List as of 3/17/2023  7:58 AM        Prescription Management: I performed a review of the patient's current Rx medication list as input by nursing staff.     Follow-up Information       PCP. Call in 2 days.               [facility] - Emergency Dept..    Specialty: Emergency Medicine  Why: If symptoms worsen  Contact information:  [address] [facility] [address] [phone]                           Scribe Attestation:   Scribe #1: I performed the above scribed service and the  documentation accurately describes the services I performed. I attest to the accuracy of the note.     Attending:   Physician Attestation Statement for Scribe #1: I, Sammy Rivera MD, personally performed the services described in this documentation, as scribed by Solomon Evans, in my presence, and it is both accurate and complete. As with other dictation methods such as dictation software, small errors or inconsistencies may be overlooked due to the goal of spending more face-to-face time with patients.    Scribe Attestation:   Scribe #2: I performed the above scribed service and the documentation accurately describes the services I performed. I attest to the accuracy of the note.    Attending Attestation:           Physician Attestation for Scribe:    Physician Attestation Statement for Scribe #2: I, José Cardenas Jr., MD, reviewed documentation, as scribed by Anjali Olmedo in my presence, and it is both accurate and complete. I also acknowledge and confirm the content of the note done by Scribe #1.       Clinical Impression       ICD-10-CM ICD-9-CM   1. Anxiety  F41.9 300.00   2. Hypertension  I10 401.9   3. Hypertension, unspecified type  I10 401.9   4. Nonintractable headache, unspecified chronicity pattern, unspecified headache type  R51.9 784.0   5. Drug abuse  F19.10 305.90     Disposition:   Disposition: Discharged  Condition: Stable       José Cardenas Jr., MD  03/17/23 0781

## 2023-03-17 NOTE — ED NOTES
Per MD pt can hv Clonidine 0.2 mg if she has a ride home. Per pt she isn't driving home. Will administer med as prescribed.

## 2023-03-20 LAB — BACTERIA UR CULT: ABNORMAL

## 2023-03-23 ENCOUNTER — TELEPHONE (OUTPATIENT)
Dept: EMERGENCY MEDICINE | Facility: HOSPITAL | Age: 38
End: 2023-03-23
Payer: MEDICAID

## 2024-09-04 ENCOUNTER — HOSPITAL ENCOUNTER (EMERGENCY)
Facility: OTHER | Age: 39
Discharge: HOME | End: 2024-09-04
Attending: EMERGENCY MEDICINE
Payer: MEDICAID

## 2024-09-04 VITALS
SYSTOLIC BLOOD PRESSURE: 165 MMHG | HEIGHT: 64 IN | OXYGEN SATURATION: 100 % | TEMPERATURE: 98 F | WEIGHT: 138 LBS | BODY MASS INDEX: 23.56 KG/M2 | DIASTOLIC BLOOD PRESSURE: 108 MMHG | RESPIRATION RATE: 13 BRPM | HEART RATE: 83 BPM

## 2024-09-04 DIAGNOSIS — R51.9 NONINTRACTABLE HEADACHE, UNSPECIFIED CHRONICITY PATTERN, UNSPECIFIED HEADACHE TYPE: Primary | ICD-10-CM

## 2024-09-04 DIAGNOSIS — I10 HYPERTENSION: ICD-10-CM

## 2024-09-04 LAB
ANION GAP SERPL CALC-SCNC: 11 MMOL/L (ref 8–16)
BASOPHILS # BLD AUTO: 0.04 K/UL (ref 0–0.2)
BASOPHILS NFR BLD: 0.8 % (ref 0–1.9)
BUN SERPL-MCNC: 12 MG/DL (ref 6–20)
CALCIUM SERPL-MCNC: 9.2 MG/DL (ref 8.7–10.5)
CHLORIDE SERPL-SCNC: 105 MMOL/L (ref 95–110)
CO2 SERPL-SCNC: 22 MMOL/L (ref 23–29)
CREAT SERPL-MCNC: 0.8 MG/DL (ref 0.5–1.4)
DIFFERENTIAL METHOD BLD: ABNORMAL
EOSINOPHIL # BLD AUTO: 0.1 K/UL (ref 0–0.5)
EOSINOPHIL NFR BLD: 1.9 % (ref 0–8)
ERYTHROCYTE [DISTWIDTH] IN BLOOD BY AUTOMATED COUNT: 17.8 % (ref 11.5–14.5)
EST. GFR  (NO RACE VARIABLE): >60 ML/MIN/1.73 M^2
GLUCOSE SERPL-MCNC: 89 MG/DL (ref 70–110)
HCT VFR BLD AUTO: 35 % (ref 37–48.5)
HGB BLD-MCNC: 10.8 G/DL (ref 12–16)
HIV 1+2 AB+HIV1 P24 AG SERPL QL IA: NEGATIVE
IMM GRANULOCYTES # BLD AUTO: 0.02 K/UL (ref 0–0.04)
IMM GRANULOCYTES NFR BLD AUTO: 0.4 % (ref 0–0.5)
LYMPHOCYTES # BLD AUTO: 1.1 K/UL (ref 1–4.8)
LYMPHOCYTES NFR BLD: 23.5 % (ref 18–48)
MCH RBC QN AUTO: 25.5 PG (ref 27–31)
MCHC RBC AUTO-ENTMCNC: 30.9 G/DL (ref 32–36)
MCV RBC AUTO: 83 FL (ref 82–98)
MONOCYTES # BLD AUTO: 0.6 K/UL (ref 0.3–1)
MONOCYTES NFR BLD: 12.8 % (ref 4–15)
NEUTROPHILS # BLD AUTO: 3 K/UL (ref 1.8–7.7)
NEUTROPHILS NFR BLD: 60.6 % (ref 38–73)
NRBC BLD-RTO: 0 /100 WBC
PLATELET # BLD AUTO: 254 K/UL (ref 150–450)
PMV BLD AUTO: 9.3 FL (ref 9.2–12.9)
POTASSIUM SERPL-SCNC: 4 MMOL/L (ref 3.5–5.1)
RBC # BLD AUTO: 4.23 M/UL (ref 4–5.4)
SODIUM SERPL-SCNC: 138 MMOL/L (ref 136–145)
WBC # BLD AUTO: 4.86 K/UL (ref 3.9–12.7)

## 2024-09-04 PROCEDURE — 36415 COLL VENOUS BLD VENIPUNCTURE: CPT | Performed by: EMERGENCY MEDICINE

## 2024-09-04 PROCEDURE — 85025 COMPLETE CBC W/AUTO DIFF WBC: CPT | Performed by: EMERGENCY MEDICINE

## 2024-09-04 PROCEDURE — 87389 HIV-1 AG W/HIV-1&-2 AB AG IA: CPT | Performed by: EMERGENCY MEDICINE

## 2024-09-04 PROCEDURE — 80048 BASIC METABOLIC PNL TOTAL CA: CPT | Performed by: EMERGENCY MEDICINE

## 2024-09-04 PROCEDURE — 25000003 PHARM REV CODE 250: Performed by: EMERGENCY MEDICINE

## 2024-09-04 PROCEDURE — 93010 ELECTROCARDIOGRAM REPORT: CPT | Mod: ,,, | Performed by: INTERNAL MEDICINE

## 2024-09-04 PROCEDURE — 93005 ELECTROCARDIOGRAM TRACING: CPT

## 2024-09-04 PROCEDURE — 99284 EMERGENCY DEPT VISIT MOD MDM: CPT | Mod: 25

## 2024-09-04 RX ORDER — HYDRALAZINE HYDROCHLORIDE 10 MG/1
10 TABLET, FILM COATED ORAL 3 TIMES DAILY
Qty: 180 TABLET | Refills: 0 | Status: SHIPPED | OUTPATIENT
Start: 2024-09-04 | End: 2024-11-03

## 2024-09-04 RX ORDER — HYDRALAZINE HYDROCHLORIDE 25 MG/1
25 TABLET, FILM COATED ORAL
Status: COMPLETED | OUTPATIENT
Start: 2024-09-04 | End: 2024-09-04

## 2024-09-04 RX ORDER — BUTALBITAL, ACETAMINOPHEN AND CAFFEINE 50; 325; 40 MG/1; MG/1; MG/1
2 TABLET ORAL
Status: COMPLETED | OUTPATIENT
Start: 2024-09-04 | End: 2024-09-04

## 2024-09-04 RX ADMIN — HYDRALAZINE HYDROCHLORIDE 25 MG: 25 TABLET ORAL at 07:09

## 2024-09-04 RX ADMIN — BUTALBITAL, ACETAMINOPHEN, AND CAFFEINE 2 TABLET: 325; 50; 40 TABLET ORAL at 08:09

## 2024-09-05 NOTE — DISCHARGE INSTRUCTIONS
Mrs. Juan,    Thank you for letting me care for you today! It was nice meeting you, and I hope you feel better soon.   If you would like access to your chart and what was done today please utilize the [facility].   Please come back to [facility] for all of your future medical needs.    Our goal in the emergency department is to always give you outstanding care and exceptional service. You may receive a survey by mail or e-mail in the next week regarding your experience in our ED. We would greatly appreciate you completing and returning the survey. Your feedback provides us with a way to recognize our staff who give very good care and it helps us learn how to improve when your experience was below our aspiration of excellence.     Sincerely,    Thaddeus Dill MD  Board Certified Emergency Physician

## 2024-09-05 NOTE — ED PROVIDER NOTES
Encounter Date: 2024       History     Chief Complaint   Patient presents with    Headache     Pt sent from [facility] for hypertension with a headache and dizziness. Pt states she's had symptoms for a few hours, no blurry vision no neuro deficits. Pt states that she has been out of her hydralazine for a few days.     This is a pleasant 40 yo female with a pmh of opiate dependency and hypertension presenting for evaluation of high blood pressure in the setting of being in detox/rehab currently. She has had some frontal headache which she associates with high blood pressure and when she had it checked in her facility it was noted to be high at which time she was given clonidine and referred to the hospital. She denies any focal numbness or weakness and has not taken anything else to help with this. She has had episodes like this previously when her pressure was elevated.     The history is provided by the patient and medical records.     Review of patient's allergies indicates:  No Known Allergies  Past Medical History:   Diagnosis Date    Hypertension     Kidney stones      Past Surgical History:   Procedure Laterality Date     SECTION      LITHOTRIPSY       Family History   Problem Relation Name Age of Onset    Hypertension Mother      Hypertension Father       Social History     Tobacco Use    Smoking status: Every Day     Current packs/day: 0.50     Types: Cigarettes   Substance Use Topics    Alcohol use: Yes     Comment: occasionally     Drug use: No     Review of Systems  Constitutional-no fever  HEENT-no congestion  Eyes-no redness  Respiratory-no shortness of breath  Cardio-no chest pain  GI-no abdominal pain  Endocrine-no cold intolerance  -no difficulty urinating  MSK-no myalgias  Skin-no rashes  Allergy-no environmental allergy  Neurologic-+ headache  Hematology-no swollen nodes  Behavioral-no confusion   Physical Exam     Initial Vitals [24 1914]   BP Pulse Resp Temp SpO2   (!)  200/117 90 18 98 °F (36.7 °C) 99 %      MAP       --         Physical Exam  Constitutional: mildly uncomfortable appearing 38 yo woman in no obvious distress  Eyes: Conjunctivae normal.  ENT       Head: Normocephalic, atraumatic.       Nose: Normal external appearance        Mouth/Throat: no strigulous respirations   Hematological/Lymphatic/Immunilogical: no visible lymphadenopathy   Cardiovascular: Normal rate,   Respiratory: Normal respiratory effort.   Gastrointestinal: non distended   Musculoskeletal: Normal range of motion in all extremities. No obvious deformities or swelling.  Neurologic: Alert, oriented. Normal speech and language. No gross focal neurologic deficits are appreciated.  NIH 0  GCS- 15  CN2-12 intact  Normal sensation to light and deep touch in the bilateral face  Normal sensation to light and deep touch in the bilateral upper extremities  Normal sensation to light deep touch in the bilateral lower extremities  5/5 strength all 4 extremities  Normal gait  Negative rhomberg  No appreciable drift   Skin: Skin is warm, dry. No rash noted.  Psychiatric: Mood and affect are normal.    ED Course   Procedures  Labs Reviewed   BASIC METABOLIC PANEL - Abnormal       Result Value    Sodium 138      Potassium 4.0      Chloride 105      CO2 22 (*)     Glucose 89      BUN 12      Creatinine 0.8      Calcium 9.2      Anion Gap 11      eGFR >60     CBC W/ AUTO DIFFERENTIAL - Abnormal    WBC 4.86      RBC 4.23      Hemoglobin 10.8 (*)     Hematocrit 35.0 (*)     MCV 83      MCH 25.5 (*)     MCHC 30.9 (*)     RDW 17.8 (*)     Platelets 254      MPV 9.3      Immature Granulocytes 0.4      Gran # (ANC) 3.0      Immature Grans (Abs) 0.02      Lymph # 1.1      Mono # 0.6      Eos # 0.1      Baso # 0.04      nRBC 0      Gran % 60.6      Lymph % 23.5      Mono % 12.8      Eosinophil % 1.9      Basophil % 0.8      Differential Method Automated     HIV 1 / 2 ANTIBODY   HIV 1 / 2 ANTIBODY    HIV 1/2 Ag/Ab Negative           ECG Results              EKG 12-lead (Preliminary result)  Result time 09/04/24 20:09:09      Wet Read by Thaddeus Dill MD (09/04/24 20:09:09, [facility] Emergency Dept, Emergency Medicine)    My EKG interpretation, sinus rhythm, 93 beats per minute, normal axis, no ST segment changes, when compared to previous EKG 03/17/2023 relatively unchanged                                  Imaging Results    None          Medications   hydrALAZINE tablet 25 mg (25 mg Oral Given 9/4/24 1948)   butalbital-acetaminophen-caffeine -40 mg per tablet 2 tablet (2 tablets Oral Given 9/4/24 2033)     Medical Decision Making  Ddx- htn, hypertensive urgency, med effect, withdrawal, migraine    Neuro intact.  Undertreated BP.  Well appearing. Improving bp with meds.        Problems Addressed:  Hypertension: chronic illness or injury with exacerbation, progression, or side effects of treatment  Nonintractable headache, unspecified chronicity pattern, unspecified headache type: acute illness or injury    Amount and/or Complexity of Data Reviewed  External Data Reviewed: labs, radiology, ECG and notes.     Details: Previous evaluations and encounters with htn   Labs: ordered. Decision-making details documented in ED Course.  ECG/medicine tests: ordered and independent interpretation performed. Decision-making details documented in ED Course.    Risk  OTC drugs.  Prescription drug management.  Parenteral controlled substances.  Decision regarding hospitalization.  Diagnosis or treatment significantly limited by social determinants of health.  Risk Details: Substance abuse complicates this patients care as a social determinant of health.                                       Clinical Impression:  Final diagnoses:  [I10] Hypertension  [R51.9] Nonintractable headache, unspecified chronicity pattern, unspecified headache type (Primary)          ED Disposition Condition    Discharge Stable          ED Prescriptions       Medication  Sig Dispense Start Date End Date Auth. Provider    hydrALAZINE (APRESOLINE) 10 MG tablet Take 1 tablet (10 mg total) by mouth 3 (three) times daily. 180 tablet 9/4/2024 11/3/2024 Thaddeus Dill MD          Follow-up Information       Follow up With Specialties Details Why Contact Info    [facility] - Emergency Dept Emergency Medicine Go to  As needed, For a follow up visit about today [address] [facility] [id number] [phone]             Thaddeus Dill MD  09/05/24 [id number]

## 2024-09-05 NOTE — ED TRIAGE NOTES
Chandan Juan, an 39 y.o. female presents to the ED with headache and high bp. States she has not taken hydralazine in several days      Chief Complaint   Patient presents with    Headache     Pt sent from [facility] for hypertension with a headache and dizziness. Pt states she's had symptoms for a few hours, no blurry vision no neuro deficits. Pt states that she has been out of her hydralazine for a few days.     Review of patient's allergies indicates:  No Known Allergies  Past Medical History:   Diagnosis Date    Hypertension     Kidney stones

## 2024-09-06 LAB
OHS QRS DURATION: 78 MS
OHS QTC CALCULATION: 455 MS